# Patient Record
Sex: FEMALE | Race: WHITE
[De-identification: names, ages, dates, MRNs, and addresses within clinical notes are randomized per-mention and may not be internally consistent; named-entity substitution may affect disease eponyms.]

---

## 2020-08-05 ENCOUNTER — HOSPITAL ENCOUNTER (EMERGENCY)
Dept: HOSPITAL 62 - ER | Age: 75
Discharge: ANOTHER HOSPITAL | End: 2020-08-05
Payer: MEDICARE

## 2020-08-05 VITALS — SYSTOLIC BLOOD PRESSURE: 117 MMHG | DIASTOLIC BLOOD PRESSURE: 48 MMHG

## 2020-08-05 DIAGNOSIS — Z79.84: ICD-10-CM

## 2020-08-05 DIAGNOSIS — R19.7: ICD-10-CM

## 2020-08-05 DIAGNOSIS — Z79.899: ICD-10-CM

## 2020-08-05 DIAGNOSIS — J96.00: ICD-10-CM

## 2020-08-05 DIAGNOSIS — R65.20: ICD-10-CM

## 2020-08-05 DIAGNOSIS — N18.5: ICD-10-CM

## 2020-08-05 DIAGNOSIS — E87.5: ICD-10-CM

## 2020-08-05 DIAGNOSIS — N39.0: ICD-10-CM

## 2020-08-05 DIAGNOSIS — E87.2: ICD-10-CM

## 2020-08-05 DIAGNOSIS — E86.0: ICD-10-CM

## 2020-08-05 DIAGNOSIS — I12.0: ICD-10-CM

## 2020-08-05 DIAGNOSIS — R41.0: ICD-10-CM

## 2020-08-05 DIAGNOSIS — N17.0: ICD-10-CM

## 2020-08-05 DIAGNOSIS — I45.10: ICD-10-CM

## 2020-08-05 DIAGNOSIS — R53.1: ICD-10-CM

## 2020-08-05 DIAGNOSIS — E78.00: ICD-10-CM

## 2020-08-05 DIAGNOSIS — E11.22: ICD-10-CM

## 2020-08-05 DIAGNOSIS — A41.9: Primary | ICD-10-CM

## 2020-08-05 DIAGNOSIS — R11.2: ICD-10-CM

## 2020-08-05 DIAGNOSIS — I44.0: ICD-10-CM

## 2020-08-05 LAB
ABSOLUTE LYMPHOCYTES# (MANUAL): 3.2 10^3/UL (ref 0.5–4.7)
ABSOLUTE MONOCYTES # (MANUAL): 0.6 10^3/UL (ref 0.1–1.4)
ADD MANUAL DIFF: YES
ALBUMIN SERPL-MCNC: 3.7 G/DL (ref 3.5–5)
ALP SERPL-CCNC: 67 U/L (ref 38–126)
ANION GAP SERPL CALC-SCNC: 32 MMOL/L (ref 5–19)
ANION GAP SERPL CALC-SCNC: 33 MMOL/L (ref 5–19)
APPEARANCE UR: (no result)
APTT PPP: YELLOW S
AST SERPL-CCNC: 27 U/L (ref 14–36)
BARBITURATES UR QL SCN: NEGATIVE
BASE EXCESS BLDV CALC-SCNC: -26 MMOL/L
BASOPHILS NFR BLD MANUAL: 0 % (ref 0–2)
BILIRUB DIRECT SERPL-MCNC: 0 MG/DL (ref 0–0.4)
BILIRUB SERPL-MCNC: 0.2 MG/DL (ref 0.2–1.3)
BILIRUB UR QL STRIP: NEGATIVE
BUN SERPL-MCNC: 73 MG/DL (ref 7–20)
BUN SERPL-MCNC: 74 MG/DL (ref 7–20)
CALCIUM: 9.2 MG/DL (ref 8.4–10.2)
CALCIUM: 9.4 MG/DL (ref 8.4–10.2)
CHLORIDE SERPL-SCNC: 98 MMOL/L (ref 98–107)
CHLORIDE SERPL-SCNC: 99 MMOL/L (ref 98–107)
CO2 SERPL-SCNC: 5 MMOL/L (ref 22–30)
CO2 SERPL-SCNC: 6 MMOL/L (ref 22–30)
EOSINOPHIL NFR BLD MANUAL: 0 % (ref 0–6)
ERYTHROCYTE [DISTWIDTH] IN BLOOD BY AUTOMATED COUNT: 13.2 % (ref 11.5–14)
ETHANOL SERPL-MCNC: < 10 MG/DL
GLUCOSE SERPL-MCNC: 283 MG/DL (ref 75–110)
GLUCOSE SERPL-MCNC: 318 MG/DL (ref 75–110)
GLUCOSE UR STRIP-MCNC: NEGATIVE MG/DL
HCO3 BLDV-SCNC: 6.6 MMOL/L (ref 20–32)
HCT VFR BLD CALC: 34.1 % (ref 36–47)
HGB BLD-MCNC: 10.4 G/DL (ref 12–15.5)
KETONES UR STRIP-MCNC: (no result) MG/DL
MACROCYTES BLD QL SMEAR: (no result)
MCH RBC QN AUTO: 30.4 PG (ref 27–33.4)
MCHC RBC AUTO-ENTMCNC: 30.4 G/DL (ref 32–36)
MCV RBC AUTO: 100 FL (ref 80–97)
METHADONE UR QL SCN: NEGATIVE
MONOCYTES % (MANUAL): 3 % (ref 3–13)
NITRITE UR QL STRIP: NEGATIVE
PCO2 BLDV: 35.8 MMHG (ref 35–63)
PCP UR QL SCN: NEGATIVE
PH BLDV: 6.88 [PH] (ref 7.3–7.42)
PH UR STRIP: 5 [PH] (ref 5–9)
PLATELET # BLD: 493 10^3/UL (ref 150–450)
PLATELET CLUMP BLD QL SMEAR: PRESENT
PLATELET COMMENT: (no result)
POLYCHROMASIA BLD QL SMEAR: SLIGHT
POTASSIUM SERPL-SCNC: 8.6 MMOL/L (ref 3.6–5)
POTASSIUM SERPL-SCNC: 9.2 MMOL/L (ref 3.6–5)
PROT SERPL-MCNC: 6.2 G/DL (ref 6.3–8.2)
PROT UR STRIP-MCNC: 100 MG/DL
RBC # BLD AUTO: 3.41 10^6/UL (ref 3.72–5.28)
SEGMENTED NEUTROPHILS % (MAN): 81 % (ref 42–78)
SP GR UR STRIP: 1.01
TOTAL CELLS COUNTED BLD: 100
TOXIC GRANULES BLD QL SMEAR: SLIGHT
URINE AMPHETAMINES SCREEN: NEGATIVE
URINE BENZODIAZEPINES SCREEN: NEGATIVE
URINE COCAINE SCREEN: NEGATIVE
URINE MARIJUANA (THC) SCREEN: NEGATIVE
UROBILINOGEN UR-MCNC: NEGATIVE MG/DL (ref ?–2)
VARIANT LYMPHS NFR BLD MANUAL: 15 % (ref 13–45)
WBC # BLD AUTO: 20 10^3/UL (ref 4–10.5)
WBC TOXIC VACUOLES BLD QL SMEAR: PRESENT

## 2020-08-05 PROCEDURE — 87150 DNA/RNA AMPLIFIED PROBE: CPT

## 2020-08-05 PROCEDURE — 83735 ASSAY OF MAGNESIUM: CPT

## 2020-08-05 PROCEDURE — 87086 URINE CULTURE/COLONY COUNT: CPT

## 2020-08-05 PROCEDURE — 87040 BLOOD CULTURE FOR BACTERIA: CPT

## 2020-08-05 PROCEDURE — 36415 COLL VENOUS BLD VENIPUNCTURE: CPT

## 2020-08-05 PROCEDURE — 81001 URINALYSIS AUTO W/SCOPE: CPT

## 2020-08-05 PROCEDURE — 71045 X-RAY EXAM CHEST 1 VIEW: CPT

## 2020-08-05 PROCEDURE — 96361 HYDRATE IV INFUSION ADD-ON: CPT

## 2020-08-05 PROCEDURE — 82803 BLOOD GASES ANY COMBINATION: CPT

## 2020-08-05 PROCEDURE — 93010 ELECTROCARDIOGRAM REPORT: CPT

## 2020-08-05 PROCEDURE — 83605 ASSAY OF LACTIC ACID: CPT

## 2020-08-05 PROCEDURE — 82962 GLUCOSE BLOOD TEST: CPT

## 2020-08-05 PROCEDURE — 51702 INSERT TEMP BLADDER CATH: CPT

## 2020-08-05 PROCEDURE — 96374 THER/PROPH/DIAG INJ IV PUSH: CPT

## 2020-08-05 PROCEDURE — 99285 EMERGENCY DEPT VISIT HI MDM: CPT

## 2020-08-05 PROCEDURE — 96375 TX/PRO/DX INJ NEW DRUG ADDON: CPT

## 2020-08-05 PROCEDURE — 85025 COMPLETE CBC W/AUTO DIFF WBC: CPT

## 2020-08-05 PROCEDURE — 36556 INSERT NON-TUNNEL CV CATH: CPT

## 2020-08-05 PROCEDURE — 94660 CPAP INITIATION&MGMT: CPT

## 2020-08-05 PROCEDURE — 93005 ELECTROCARDIOGRAM TRACING: CPT

## 2020-08-05 PROCEDURE — 87077 CULTURE AEROBIC IDENTIFY: CPT

## 2020-08-05 PROCEDURE — 80307 DRUG TEST PRSMV CHEM ANLYZR: CPT

## 2020-08-05 PROCEDURE — 96376 TX/PRO/DX INJ SAME DRUG ADON: CPT

## 2020-08-05 PROCEDURE — 31500 INSERT EMERGENCY AIRWAY: CPT

## 2020-08-05 PROCEDURE — 80053 COMPREHEN METABOLIC PANEL: CPT

## 2020-08-05 RX ADMIN — MIDAZOLAM HYDROCHLORIDE PRN MLS/HR: 5 INJECTION, SOLUTION INTRAMUSCULAR; INTRAVENOUS at 17:33

## 2020-08-05 RX ADMIN — MIDAZOLAM HYDROCHLORIDE PRN MLS/HR: 5 INJECTION, SOLUTION INTRAMUSCULAR; INTRAVENOUS at 18:48

## 2020-08-05 RX ADMIN — NOREPINEPHRINE BITARTRATE PRN MLS/HR: 1 INJECTION, SOLUTION, CONCENTRATE INTRAVENOUS at 18:30

## 2020-08-05 RX ADMIN — NOREPINEPHRINE BITARTRATE PRN MLS/HR: 1 INJECTION, SOLUTION, CONCENTRATE INTRAVENOUS at 18:34

## 2020-08-05 NOTE — OPERATIVE REPORT
Nonrecallable Operative Report


DATE OF SURGERY: 08/05/20


PREOPERATIVE DIAGNOSIS: 1.  Sepsis.  2.  Hyperkalemia requiring emergent 

dialysis.  3.  Phlebosclerosis


POSTOPERATIVE DIAGNOSIS: Same as above


OPERATION: 1.  Ultrasound-guided central venous puncture.  2.  Right femoral 

Vas-Cath placement


SURGEON: HAMILTON VALENCIA


ANESTHESIA: Local


TISSUE REMOVED OR ALTERED: None


COMPLICATIONS: 





None apparent


ESTIMATED BLOOD LOSS: Minimal


PROCEDURE: 





Drains/implants: Right femoral Vas-Cath at 30 cm.





Procedure in detail: Emergency consent was obtained due to the patient's 

hyperkalemia, severe sepsis, and intubated status.  The right groin was prepped 

and draped in a normal sterile fashion.  The ultrasound was used to identify the

right femoral vein.  Under direct ultrasonic guidance, the femoral vein was 

cannulated with the supplied access needle.  Dark venous, nonpulsatile blood was

returned in the syringe.  The wire was inserted into the vein easily.  The wire 

was confirmed to be within the lumen of the vein using the ultrasound device.  

Picture documentation was obtained.  Next, the catheter was slid over the wire 

using a modified Seldinger technique.  The catheter aspirated and flushed 

easily.  The catheter returned dark venous, nonpulsatile blood.  After the 

catheter was flushed x3, it was sutured to the skin.  An occlusive dressing was 

placed over the catheter.  The procedure was at this time concluded.  All 

sponge, instrument, and needle counts were correct.





Condition: Critical.

## 2020-08-05 NOTE — RADIOLOGY REPORT (SQ)
EXAM DESCRIPTION:  CHEST SINGLE VIEW



IMAGES COMPLETED DATE/TIME:  8/5/2020 4:34 pm



REASON FOR STUDY:  ETT placement



COMPARISON:  Same date at 1401 hours.

.



EXAM PARAMETERS:  NUMBER OF VIEWS: One view.

TECHNIQUE: Single frontal radiographic view of the chest acquired.

RADIATION DOSE: NA

LIMITATIONS: None.



FINDINGS:  LUNGS AND PLEURA: Patchy bibasilar atelectasis.  No focal consolidation or pleural effusio
n.

MEDIASTINUM AND HILAR STRUCTURES: No masses.  Contour normal.

HEART AND VASCULAR STRUCTURES: Heart normal in size.  Normal vasculature.

BONES: No acute findings.

HARDWARE: Interval placement of endotracheal tube with tip approximately 1.9 cm above the shyann.  Es
ophagogastric tube tip and side-hole are below the diaphragm likely within the stomach.

OTHER: No other significant finding.



IMPRESSION:  Endotracheal and esophagogastric tubes are in good position.  Patchy bibasilar atelectas
is.



TECHNICAL DOCUMENTATION:  JOB ID:  [id number]

 2011 [employer]- All Rights Reserved



Reading location - IP/workstation name: [id number]

## 2020-08-05 NOTE — RADIOLOGY REPORT (SQ)
EXAM DESCRIPTION:  CHEST SINGLE VIEW



IMAGES COMPLETED DATE/TIME:  8/5/2020 2:03 pm



REASON FOR STUDY:  dyspnea



COMPARISON:  None.



EXAM PARAMETERS:  NUMBER OF VIEWS: One view.

TECHNIQUE: Single frontal radiographic view of the chest acquired.

RADIATION DOSE: NA

LIMITATIONS: None.



FINDINGS:  LUNGS AND PLEURA: Low lung volumes without focal consolidation, large pleural effusion, or
 pneumothorax.

MEDIASTINUM AND HILAR STRUCTURES: No masses.  Contour normal.

HEART AND VASCULAR STRUCTURES: Heart normal in size.  Normal vasculature.

BONES: No acute findings.

HARDWARE: None in the chest.

OTHER: No other significant finding.



IMPRESSION:  No evidence of acute cardiopulmonary abnormality.



TECHNICAL DOCUMENTATION:  JOB ID:  [id number]

 2011 [employer]- All Rights Reserved



Reading location - IP/workstation name: [email or web address]

## 2020-08-05 NOTE — PDOC CRITICAL CARE PROG REPORT
General


Date:: 08/05/20


Hospital Day:: 1


Resuscitation Status: Full Code


Events in the past 12 to 24 Hours:: 





N/V, dehydration and ARF with hyperkalemia.


Review of systems relevant to events:: 





GI, renal.


Reason for ICU Addmission:: Evaluation.





- Medications:


Medications reviewed and adjusted accordingly: Yes


Vasopressors:: 





None


Sedation:: 





None





Physical Exam


Vital Signs: 


                                        











Temp Pulse Resp BP Pulse Ox


 


       27 H  123/62   99 


 


       08/05/20 16:43  08/05/20 15:31  08/05/20 16:43








                                 Intake & Output











 08/04/20 08/05/20 08/06/20





 06:59 06:59 06:59


 


Intake Total   1000


 


Balance   1000


 


Weight   108.862 kg








                                  Weight/Height





Weight                           108.862 kg


Height                           5 ft 1 in








General appearance: PRESENT: cooperative, mild distress, morbidly obese


Head exam: PRESENT: atraumatic, normocephalic


Eye exam: PRESENT: conjunctiva pink, EOMI, PERRLA.  ABSENT: scleral icterus


Ear exam: PRESENT: normal external ear exam


Mouth exam: PRESENT: dry mucosa, tongue midline


Respiratory exam: PRESENT: clear to auscultation kuldip.  ABSENT: rales, rhonchi, 

wheezes


Cardiovascular exam: PRESENT: RRR.  ABSENT: diastolic murmur, rubs, systolic 

murmur


GI/Abdominal exam: PRESENT: distended, soft


Rectal exam: PRESENT: deferred


Gentrourinary exam: PRESENT: indwelling catheter


Extremities exam: PRESENT: full ROM.  ABSENT: calf tenderness, clubbing, pedal 

edema


Musculoskeletal exam: PRESENT: normal inspection


Neurological exam: PRESENT: alert, awake, oriented to person, oriented to place,

oriented to time, oriented to situation


Psychiatric exam: PRESENT: appropriate affect, normal mood.  ABSENT: homicidal 

ideation, suicidal ideation


Skin exam: PRESENT: dry, intact, warm.  ABSENT: cyanosis, rash





Laboratory/Radiographs


Laboratory Results: 


                                        





                                 08/05/20 12:35 





                                 08/05/20 15:45 





                                        











  08/05/20 08/05/20 08/05/20





  12:35 12:35 12:35


 


WBC  20.0 H  


 


RBC  3.41 L  


 


Hgb  10.4 L  


 


Hct  34.1 L  


 


MCV  100 H  


 


MCH  30.4  


 


MCHC  30.4 L  


 


RDW  13.2  


 


Plt Count  493 H  


 


Seg Neutrophils %  Not Reportable  


 


VBG pH    6.88 L*


 


VBG pCO2    35.8


 


VBG HCO3    6.6 L


 


VBG Base Excess    -26.0


 


Sodium   Cancelled 


 


Potassium   Cancelled 


 


Chloride   Cancelled 


 


Carbon Dioxide   Cancelled 


 


Anion Gap   Cancelled 


 


BUN   Cancelled 


 


Creatinine   Cancelled 


 


Est GFR ( Amer)   Cancelled 


 


Est GFR (Non-Af Amer)   Cancelled 


 


Glucose   Cancelled 


 


Lactic Acid   


 


Calcium   Cancelled 


 


Magnesium   Cancelled 


 


Total Bilirubin   Cancelled 


 


AST   Cancelled 


 


Alkaline Phosphatase   Cancelled 


 


Total Protein   Cancelled 


 


Albumin   Cancelled 


 


Urine Color   


 


Urine Appearance   


 


Urine pH   


 


Ur Specific Gravity   


 


Urine Protein   


 


Urine Glucose (UA)   


 


Urine Ketones   


 


Urine Blood   


 


Urine Nitrite   


 


Ur Leukocyte Esterase   


 


Urine WBC (Auto)   


 


Urine RBC (Auto)   














  08/05/20 08/05/20 08/05/20





  12:35 14:11 14:11


 


WBC   


 


RBC   


 


Hgb   


 


Hct   


 


MCV   


 


MCH   


 


MCHC   


 


RDW   


 


Plt Count   


 


Seg Neutrophils %   


 


VBG pH   


 


VBG pCO2   


 


VBG HCO3   


 


VBG Base Excess   


 


Sodium   135.8 L 


 


Potassium   9.2 H* 


 


Chloride   98 


 


Carbon Dioxide   6 L* 


 


Anion Gap   32 H 


 


BUN   73 H 


 


Creatinine   11.82 H 


 


Est GFR ( Amer)   4 L 


 


Est GFR (Non-Af Amer)   


 


Glucose   318 H 


 


Lactic Acid  Cancelled   7.0 H


 


Calcium   9.4 


 


Magnesium   2.6 H 


 


Total Bilirubin   0.2 


 


AST   27 


 


Alkaline Phosphatase   67 


 


Total Protein   6.2 L 


 


Albumin   3.7 


 


Urine Color   


 


Urine Appearance   


 


Urine pH   


 


Ur Specific Gravity   


 


Urine Protein   


 


Urine Glucose (UA)   


 


Urine Ketones   


 


Urine Blood   


 


Urine Nitrite   


 


Ur Leukocyte Esterase   


 


Urine WBC (Auto)   


 


Urine RBC (Auto)   














  08/05/20 08/05/20





  15:35 15:45


 


WBC  


 


RBC  


 


Hgb  


 


Hct  


 


MCV  


 


MCH  


 


MCHC  


 


RDW  


 


Plt Count  


 


Seg Neutrophils %  


 


VBG pH  


 


VBG pCO2  


 


VBG HCO3  


 


VBG Base Excess  


 


Sodium   136.9 L


 


Potassium   8.6 H*


 


Chloride   99


 


Carbon Dioxide   5 L*


 


Anion Gap   33 H


 


BUN   74 H


 


Creatinine   12.33 H


 


Est GFR ( Amer)   4 L


 


Est GFR (Non-Af Amer)  


 


Glucose   283 H


 


Lactic Acid  


 


Calcium   9.2


 


Magnesium  


 


Total Bilirubin  


 


AST  


 


Alkaline Phosphatase  


 


Total Protein  


 


Albumin  


 


Urine Color  YELLOW 


 


Urine Appearance  CLOUDY 


 


Urine pH  5.0 


 


Ur Specific Gravity  1.012 


 


Urine Protein  100 H 


 


Urine Glucose (UA)  NEGATIVE 


 


Urine Ketones  TRACE H 


 


Urine Blood  LARGE H 


 


Urine Nitrite  NEGATIVE 


 


Ur Leukocyte Esterase  LARGE H 


 


Urine WBC (Auto)  86 


 


Urine RBC (Auto)  >182 











Impressions: 


                                        





Chest X-Ray  08/05/20 13:18


IMPRESSION:  No evidence of acute cardiopulmonary abnormality.


 











EKG: 





NSR now. Earlier had peaked T-waves.


All labs, radiographs, diagnostic studies and EKGs were personally reviewed: Yes


In addition, reports of radiographic and diagnostic studies were read: Yes





Assessment and Plan





- Diagnosis


(1) Acute renal failure


Qualifiers: 


   Acute renal failure type: with acute tubular necrosis   Qualified Code(s): 

N17.0 - Acute kidney failure with tubular necrosis   


Is this a current diagnosis for this admission?: Yes   


Plan: 


Profound with CR about 12 and GFR 4. Largely from dehydration. Hyperkalemic as 

well. I doubt her Cr will improve much in the next 24 hours.








(2) Diabetes


Qualifiers: 


   Chronic kidney disease stage: stage 5, not on chronic dialysis 


Is this a current diagnosis for this admission?: Yes   


Plan: 


Currently not controlled in 3-400 range








(3) Hyperkalemia


Is this a current diagnosis for this admission?: Yes   


Plan: 


Level is nearly 9. Repeat aftewr fluid and bicarb is 8.6. Pt needs emergant HD 

or CRRT. HD not available at this time. CRRT not done at [facility]. Pt needs 

transfer to go to [facility].








(4) Metabolic acidosis


Is this a current diagnosis for this admission?: Yes   


Plan: 


Still acidotic despite bicarb drip.








(5) Nausea vomiting and diarrhea


Is this a current diagnosis for this admission?: Yes   


Plan: 


Currently not active but the illness has lasted 3-4 days and patient has been 

taking lasix thus worsening the situation.





Plan Summary: 





[facility] requets intubation for secure airway on bipap currently.





Critical Time


Critical Time (minutes): 40


Level of Care: ICU


[facility]tic[facility]


Anticipated DC Timeframe: Other


-: 


1.  The care of a critical patient is a dynamic process.  This note is a 

representative synopsis but static in nature.  The timeframe for treatments 

given in order is not necessarily the actual time these treatments may have been

done.





2.  This patient requires critical care secondary to ongoing requirements for 

therapy not offered or safe outside the critical care environment.  Transfer to 

a lower level of care will result in altered life or limb morbidity and 

mortality.





3.  Multidisciplinary rounds completed.





4.  ABCDE bundle addressed.

## 2020-08-05 NOTE — ER DOCUMENT REPORT
ED General





<NICKOLAS STINSON P - Last Filed: 08/05/20 20:05>





- Related Data


Home Medications: Lasix, labetalol, metformin, pravastatin, myocarditis,





<SHALONDA DEVINE - Last Filed: 08/06/20 06:33>





- General


Chief Complaint: General Weakness


Stated Complaint: GENERAL WEAKNESS


Time Seen by Provider: 08/05/20 12:53


Primary Care Provider: 


MASHA RAIN MD [Primary Care Provider] - Follow up as needed





- HPI


Notes: 





Patient is a 75-year-old female brought in the emergency department for 

evaluation by EMS.  Eventually she is had vomiting and diarrhea for the last 3 

days.  She has been weak and increasingly confused.  She has had multiple 

episodes of diarrhea in the last 24 hours.  Patient states she is only had "dry 

heaves" today.  She denies any pain.  Patient's daughter notes that she seems to

have been having increased difficulty breathing starting yesterday.  Otherwise 

she has been taking her medications as prescribed.  No recent antibiotics. 

(SHALONDA DEVINE)





- Related Data


Allergies/Adverse Reactions: 


                                        





No Known Allergies Allergy (Unverified 08/05/20 16:18)


   











Past Medical History





- General


Information source: Patient, Relative - Daughter





- Social History


Smoking Status: Never Smoker


Frequency of alcohol use: None


Family History: Reviewed & Not Pertinent





- Medical History


Medical History: Other - Anemia





- Past Medical History


Cardiac Medical History: Reports: Hx Hypercholesterolemia, Hx Hypertension


Neurological Medical History: Reports: Hx Cerebrovascular Accident


Endocrine Medical History: Reports: Hx Diabetes Mellitus Type 2


GI Medical History: Reports: Hx Gastroesophageal Reflux Disease, Hx Ulcer


Past Surgical History: Reports: None





<SHALONDA DEVINE - Last Filed: 08/06/20 06:33>





Review of Systems





- Review of Systems


Constitutional: Weakness


Respiratory: See HPI


Gastrointestinal: See HPI


Neurological/Psychological: See HPI


-: Yes All other systems reviewed and negative





<SHALONDA DEVINE - Last Filed: 08/06/20 06:33>





Physical Exam





<SHALONDA DEVINE - Last Filed: 08/06/20 06:33>





- Vital signs


Vitals: 


                                        











Pulse Ox


 


 100 


 


 08/05/20 12:22














- Notes


Notes: 





This is a 75-year-old female who appears her stated age in a significant amount 

of distress.  She is obviously dyspneic, with abdominal breathing, clear 

respiratory distress.  She is drowsy but arouses easily to verbal stimuli.  

Vital signs reviewed, please refer to chart. Head is normocephalic, atraumatic. 

Pupils equal round, reactive to light.  Oral mucosa slightly dry.  Neck is 

supple without meningismus.  Heart is regular rate and rhythm.  Lungs reveal 

diminished breath sounds but no wheezes, rales, rhonchi.  Abdomen is obese, 

nontender, normoactive bowel sounds throughout.  Extremities without cyanosis, 

clubbing. Posterior calves are nontender.  Peripheral pulses are equal.  Skin is

warm and dry.  Patient is awake, alert, cooperative with examiner. (SHALONDA DEVINE)





Course





- Laboratory


Result Diagrams: 


                                 08/05/20 12:35





                                 08/05/20 15:45





<NICKOLAS STINSON - Last Filed: 08/05/20 20:05>





- Laboratory


Result Diagrams: 


                                 08/05/20 12:35





                                 08/05/20 15:45





- Diagnostic Test


Radiology reviewed: Reports reviewed





<SHALONDA DEVINE - Last Filed: 08/06/20 06:33>





- Re-evaluation


Re-evalutation: 





08/05/20 18:59


I received pt in turnover from Dr. Devine awaiting transfer to [facility].  In

short, unfortunate female with Acute Resp failure and Acute Renal Failure - 

oliguric - in need of emergent HD and no history of previous need for dialysis. 

She is currently on ventilator and levophed and most recent SBP is approximately

100.  Sedated on Versed at 2 mg hours.  No urine in canales at this point. 


08/05/20 20:05


Helicopter transfer team is here and in the process of transporting pt.  She is 

stable for transfer and Dr. Arguelles graciously has placed a trialysis catheter 

prior to transfer.  She remains sedated and on a ventilator.  (NICKOLAS STINSON)





08/05/20 14:16


Patient presents to the emergency department for evaluation of weakness.  On 

arrival it was noted that her heart rate listed in the 160s, but I suspect this 

is incorrect artifact, as the T waves were peaked, likely getting counted twice.

 I went in and got a manual pulse which was found to be in the low 80s.  Patient

has had significant diarrhea and decreased oral intake over the last several 

days.  Her complexes are markedly wide.  Her likely diagnosis was hyperkalemia 

and acute renal failure.  She was given calcium.  Her venous pH was found to be 

6.88, so she was started on bicarb and her fluids.  When her potassium was 

reported to be just under 10, she was given insulin and dextrose.  Currently, 

her QRS complexes have shortened significantly.  She is stable.  She was placed 

on BiPAP secondary to increased respiratory distress, which has improved 

significantly as well.  I am still waiting laboratory investigations.  She has 2

peripheral IV lines.  She is currently stable, we will continue to monitor.


08/05/20 15:33


Patient has remained stable, but her blood pressure is borderline.  Currently 

her blood pressures have she had a map of 60.  Her systolic is in the 100s.  The

patient states she was feeling improved, was given a small dose of morphine for 

right buttock pain after falling a few days ago.  Patient is laboratory ve

sication she had acute renal failure,  non-anion gap metabolic acidosis.  This 

is all consistent with her diarrhea.  She does have a 20,000 white count.  She 

does have an elevated respiratory rate.  Certainly she meets sirs criteria, but 

I do not have any clear source of infection at this time.  Urinalysis and urine 

culture are pending at this time.  Blood cultures already pending.  Chest x-ray 

unremarkable.  I have spoken with Dr. Ely.  Certainly given her laboratory 

investigation findings, she may in fact need evaluation in the intensive care 

unit.  BMP repeated, EKG repeated, awaiting Dr. Ely's evaluation.


08/05/20 16:37


Dr. Ely came to the department.  He is concerned the patient may need emergent

dialysis given her numbers.  He states that if the potassium is shifted largely,

he will accept the patient.  Unfortunately, the patient's potassium is still 

8.6.  I will contact an outside facility for transfer.  The patient would prefer

to go to [facility] at this time.


08/05/20 17:08


I spoke with Dr. Adams at 1650, intensivist at [facility].  She agrees the 

patient needs transfer and likely emergent dialysis.  She states that she would 

only accept the patient, however, if her airway is secured.  She is concerned 

she has no reserve.  I went and discussed this at length with the patient as 

well as the patient's daughter.  Questions were sought and answered, and they h

ave agreed to have airway secured with ET tube at this time.  Currently room is 

being set up with glide scope, ET tube.  Will pre-oxygenate, medicate with 

etomidate.  Ordering Versed drip for sedation.  We will continue to monitor, 

awaiting intubation.


08/05/20 17:25


Patient's airway secured.  Sedation medications and vent orders placed.  

Awaiting a bed assignment at [facility].


08/05/20 17:31


Urinalysis finally obtained and showed large leukocyte esterase with white and 

red blood cells.  Urine culture is pending.  Given this and her 20,000 white 

count, as well as elevated respiratory rate, she does meet sepsis criteria.  

Patient given Rocephin.


08/05/20 17:42


After intubation, patient's blood pressure did drop into the 70s over 40s.  She 

was given a Versed 5 push in an effort to sedate her.  I suspect this may have 

been the etiology of her low blood pressure.  Verbal order given to open up 

fluids with bicarb.  1/3 L of normal saline is ordered.  Her blood pressure has 

improved.  I have ordered Levophed, but will have this held for a systolic blood

pressure greater than 90, map greater than 65.  She does have a bed assignment 

at [facility], room 3508.  Given her further change in status and critical 

laboratory values, we will attempt to fly the patient. (SHALONDA DEVINE)





- Vital Signs


Vital signs: 


                                        











Temp Pulse Resp BP Pulse Ox


 


 95.1 F L     16   117/48 L  96 


 


 08/05/20 19:46     08/05/20 19:46  08/05/20 19:46  08/05/20 19:46














- Laboratory


Laboratory results interpreted by me: 


                                        











  08/05/20 08/05/20 08/05/20





  12:35 12:35 13:52


 


WBC  20.0 H  


 


RBC  3.41 L  


 


Hgb  10.4 L  


 


Hct  34.1 L  


 


MCV  100 H  


 


MCHC  30.4 L  


 


Plt Count  493 H  


 


Seg Neuts % (Manual)  81 H  


 


Abs Neuts (Manual)  16.2 H  


 


VBG pH   6.88 L* 


 


VBG HCO3   6.6 L 


 


Sodium   


 


Potassium   


 


Carbon Dioxide   


 


Anion Gap   


 


BUN   


 


Creatinine   


 


Est GFR ( Amer)   


 


Est GFR (MDRD) Non-Af   


 


Glucose   


 


POC Glucose    437 H*


 


Lactic Acid   


 


Magnesium   


 


Total Protein   


 


Urine Protein   


 


Urine Ketones   


 


Urine Blood   


 


Ur Leukocyte Esterase   














  08/05/20 08/05/20 08/05/20





  14:11 14:11 15:35


 


WBC   


 


RBC   


 


Hgb   


 


Hct   


 


MCV   


 


MCHC   


 


Plt Count   


 


Seg Neuts % (Manual)   


 


Abs Neuts (Manual)   


 


VBG pH   


 


VBG HCO3   


 


Sodium  135.8 L  


 


Potassium  9.2 H*  


 


Carbon Dioxide  6 L*  


 


Anion Gap  32 H  


 


BUN  73 H  


 


Creatinine  11.82 H  


 


Est GFR ( Amer)  4 L  


 


Est GFR (MDRD) Non-Af  3 L  


 


Glucose  318 H  


 


POC Glucose   


 


Lactic Acid   7.0 H 


 


Magnesium  2.6 H  


 


Total Protein  6.2 L  


 


Urine Protein    100 H


 


Urine Ketones    TRACE H


 


Urine Blood    LARGE H


 


Ur Leukocyte Esterase    LARGE H














  08/05/20 08/05/20





  15:45 19:18


 


WBC  


 


RBC  


 


Hgb  


 


Hct  


 


MCV  


 


MCHC  


 


Plt Count  


 


Seg Neuts % (Manual)  


 


Abs Neuts (Manual)  


 


VBG pH  


 


VBG HCO3  


 


Sodium  136.9 L 


 


Potassium  8.6 H* 


 


Carbon Dioxide  5 L* 


 


Anion Gap  33 H 


 


BUN  74 H 


 


Creatinine  12.33 H 


 


Est GFR ( Amer)  4 L 


 


Est GFR (MDRD) Non-Af  3 L 


 


Glucose  283 H 


 


POC Glucose  


 


Lactic Acid   8.2 H


 


Magnesium  


 


Total Protein  


 


Urine Protein  


 


Urine Ketones  


 


Urine Blood  


 


Ur Leukocyte Esterase  














- Diagnostic Test


Radiology results interpreted by me: 





08/05/20 14:21





                                        





Chest X-Ray  08/05/20 13:18


IMPRESSION:  No evidence of acute cardiopulmonary abnormality.


 








 (SHALONDA DEVINE)





- EKG Interpretation by Me


Additional EKG results interpreted by me: 





08/05/20 14:21


EKG reveals sinus mechanism with a rate of 75 bpm.  First-degree AV block.  

Right bundle branch block.  She has markedly widened QRS with peaked T waves 

consistent with likely hyperkalemia.  There are no old studies available for 

comparison.


08/05/20 16:38


Repeat EKG shows a narrowing of the QRS to below 140 ms.  Peaking of T waves has

improved. (SHALONDA DEVINE)





Procedures





- Central Line


  ** Right Femoral


Time completed: 17:50


Consent obtained: No


Central line pre-insertion: Sterile PPE donned, Chloraprep applied


Central line lumen type: Triple


Ultrasound guided: Yes


Line secured with sutures: No





- Intubation


  ** Orotracheal


Airway evaluation: Abnormal 3-3-2 rule, Obese


Mallampati Classification: Class 3


Medications: Etomidate - 30 mg


Intubation method: Orotracheal


Blade type: Josiane


Blade size: 3


Equipment used: Glidescope


ETT size: 7.5


ETT secured at: Lips


ETT secured at (cm): 19


Breath Sounds after Intubation: Equal


End tidal CO2 confirmed: Yes


Ventilator settings: SIMV - See separate order





<SHALONDA DEVINE - Last Filed: 08/06/20 06:33>





- Central Line


  ** Right Femoral


Notes: 





08/05/20 18:29


Year he was prepped and draped in the usual sterile fashion.  Emergency override

consent was given as the patient was moderately hypotensive.  The right femoral 

vein was identified with the ultrasound.  Under sterile technique, the right 

femoral vein was initially cannulated with the finder needle.  There was dark 

blood, no pulsatile return.  I went to advance the guidewire and did meet some 

resistance, but was hopeful that enough of the wire was fed.  A small nick in 

the skin was placed with a 10 blade.  I again I fed the dilator, and a small 

amount of resistance was obtained.  I threaded the central line over the 

guidewire, which had already been flushed with normal saline.  It would not 

advance appropriately.  At one point I did receive return, but otherwise could 

not flush nor receive good return.  The procedure was abandoned. (SHALONDA DEVINE)





Critical Care Note





- Critical Care Note


Total time excluding time spent on procedures (mins): 95





<SHALONDA DEVINE - Last Filed: 08/06/20 06:33>





Discharge





<NICKOLAS STINSON P - Last Filed: 08/05/20 20:05>





- Discharge


Admitting Provider: Dr. Adams





<SHALONDA DEVINE - Last Filed: 08/06/20 06:33>





- Discharge


Clinical Impression: 


 Hyperkalemia, Metabolic acidosis, Nausea vomiting and diarrhea, Severe sepsis





Acute renal failure


Qualifiers:


 Acute renal failure type: unspecified Qualified Code(s): N17.9 - Acute kidney 

failure, unspecified





Leukocytosis


Qualifiers:


 Leukocytosis type: unspecified Qualified Code(s): D72.829 - Elevated white 

blood cell count, unspecified





Urinary tract infection


Qualifiers:


 Urinary tract infection type: site unspecified Hematuria presence: without 

hematuria Qualified Code(s): N39.0 - Urinary tract infection, site not specified





Condition: Stable


Disposition: [facility], [facility]


Referrals: 


MASHA RAIN MD [Primary Care Provider] - Follow up as needed

## 2020-08-05 NOTE — EKG REPORT
SEVERITY:- ABNORMAL ECG -

SINUS RHYTHM

FIRST DEGREE AV BLOCK

IVCD, CONSIDER ATYPICAL RBBB

INFERIOR INFARCT, AGE INDETERMINATE

ST ELEVATION, WITH EPSILON WAVE V1-3 SUGGESTS BRUGADA SYNDROME, CONSULT EP

:

Confirmed by: Germain Sanchez MD 05-Aug-2020 18:47:48

## 2020-08-05 NOTE — EKG REPORT
SEVERITY:- ABNORMAL ECG -

SINUS RHYTHM

FIRST DEGREE AV BLOCK

RIGHT BUNDLE BRANCH BLOCK

CONSIDER BRUGADA SYNDROME, CONSULT EP

:

Confirmed by: Germain Sanchez MD 05-Aug-2020 18:49:05